# Patient Record
Sex: MALE | Race: BLACK OR AFRICAN AMERICAN
[De-identification: names, ages, dates, MRNs, and addresses within clinical notes are randomized per-mention and may not be internally consistent; named-entity substitution may affect disease eponyms.]

---

## 2021-06-30 ENCOUNTER — HOSPITAL ENCOUNTER (OUTPATIENT)
Dept: HOSPITAL 63 - CT | Age: 71
End: 2021-06-30
Attending: FAMILY MEDICINE
Payer: COMMERCIAL

## 2021-06-30 DIAGNOSIS — I70.0: ICD-10-CM

## 2021-06-30 DIAGNOSIS — R59.0: ICD-10-CM

## 2021-06-30 DIAGNOSIS — Z85.72: ICD-10-CM

## 2021-06-30 DIAGNOSIS — Z01.812: Primary | ICD-10-CM

## 2021-06-30 DIAGNOSIS — J94.8: ICD-10-CM

## 2021-06-30 LAB
CREAT SERPL-MCNC: 1.2 MG/DL (ref 0.7–1.3)
GFR SERPLBLD BASED ON 1.73 SQ M-ARVRAT: 72.2 ML/MIN

## 2021-06-30 PROCEDURE — 71260 CT THORAX DX C+: CPT

## 2021-06-30 PROCEDURE — 36415 COLL VENOUS BLD VENIPUNCTURE: CPT

## 2021-06-30 PROCEDURE — 82565 ASSAY OF CREATININE: CPT

## 2021-06-30 NOTE — RAD
PQRS Compliance Statement:



One or more of the following individualized dose reduction techniques were utilized for this examinat
ion:  

1. Automated exposure control  

2. Adjustment of the mA and/or kV according to patient size  

3. Use of iterative reconstruction technique



Exam performed: CT scan of the chest with contrast



Indication: Hypoxia. History of non-Hodgkin's lymphoma 11 years ago in remission



Date of Service: 6/30/2021. Comparison:CT chest from 2/10/2011



Technique: Contiguous helical acquisitions are obtained through the chest during intravenous administ
ration ofIV contrast. In addition sagital and coronal reformatted images are obtained and reviewed.



CT chest findings:



The structures at the thoracic inlet including both lobes of the thyroid gland appear normal. The nec
k and intrathoracic great vessels appear grossly normal in caliber. Diffuse scattered atheromatous ca
lcification of the aortic knob is seen No large filling defects to suggest pulmonary embolism noted. 
Multiple bilateral enlarged axillary lymph nodes are seen which appear significantly smaller as stephanie
red to the prior CT chest from 2011. There is no dominant mediastinal  or hilar lymph node enlargemen
t noted. The central airway is patent without endoluminal lesions.



Tiny pleural-based 2 mm nodule is seen in the lateral apical segment left lower lobe. No additional p
ulmonary nodules or masses are identified. There is no pleural effusion or pneumothorax.



Limited evaluation of upper abdominal structures is unremarkable. Bones are normal



Impression:



1. Multiple enlarged bilateral axillary lymph nodes are seen. These however appear significantly impr
lary since prior CT scan of February 10, 2011. Patient has a given history of non-Hodgkin's lymphoma 
in remission abnormally enlarged lymph nodes may be related to the patient's known diagnosis.



Electronically signed by: Jennifer Fletcher MD (6/30/2021 9:57 AM) [facility]